# Patient Record
(demographics unavailable — no encounter records)

---

## 2025-02-26 NOTE — ASSESSMENT
[FreeTextEntry1] : 73 y o  M with prediabetes HLD  prostate ca treated with proton beam therapy hx of melanoma seen for elevated liver tests.  Elevated liver enzymes  ? JUSTINA Has elevated liver enzymes since Nov 2024 with elevation of ALT  165  Repeat lab testing in Feb 2025 show persisting elevation but improved Ashwagandha turmeric and use stopped in Jan 2025. Was on rosuvastatin Testosterone therapy several years ago Hx of melanoma and prostate ca HBsAg HCV ab negative СЕРГЕЙ AMA ASMA negative Hold supplements statin Will complete work up with HCV RNA HBV DNA Ig levels Will get MR abdomen with and without  Wt loss Bloating Check ESR CRP Will get MR abdomen and pelvis with hx of prostate ca and melanoma  Metabolic syndrome Hx of BMI 29 prediabetes and HLD Fibroscan -  CAP  [id number]  LS 4.9 F0 fibrosis  Health maintenance HBV HCV screening negative Does not have immunity for hepatitis A and B and advised  vaccination Colonoscopy done in 2023.

## 2025-02-26 NOTE — HISTORY OF PRESENT ILLNESS
[___] : Performed [unfilled] [de-identified] : 73 y o  M with HLD prediabetes OA prostate ca seen for elevated liver tests.   Patient was found to have elevated liver enzymes in Nov 2024  on routine blood work ordered by endocrinologist Dr Tim. AST 40   . Seen by PCP and had testing for autoimmune hepatitis and US abdomen done Nov 2024 showing normal liver. Patient has been on low dose rosuvastatin for years. Was taking Ashwagandha and curcumin as well as vitamin supplements for years. which he stopped in Jan 2025. Rarely drinks green tea. Denies herbal tea use.   Patient was seen by GI Dr Parish in Feb 2025 and had further blood work showing persisting elevation of liver tests. Has been having bloating and mid abdominal discomfort since June 2024. Reports acid reflux and that took Pepto-Bismol or Gaviscon which helped. Lost wt  with health eating and feels better. WTt was 190 lb and went down to 170 lb.  Admitted with COVID in 2020 and had elevation of liver enzymes then. No jaundice confusion hematemesis or melena.  No hx of liver biopsy or EGD. Has had multiple colonoscopies - last was in 2023 and no lesions reported.  Hx of prediabetes HLD. Prior BMI was 29. Hx of testosterone supplementation several years ago.  Hx of prostate ca and radiation   Retired   Lives alone. Has 2 sons. Alcohol few times a year. Drinks 2 glasses Ex smoker stopped in 2017. Smoked cigars occasionally. No drug use  No liver disease or liver cancer in family.     [de-identified] : Alb 3.9 TB 0.9  AST 79  WBC 8 Hb 15 Plt 180 MCV 90 N 76 % INR 0.8 СЕРГЕЙ negative ASMA AMA negative  [de-identified] : Unremarkable liver. Gallstones biliary sludge Normal spleen.  [FreeTextEntry1] : Nov 2024  Hepatitis  A total non-reactive Hepatitis A Ig M non-reactive HBcAb Ig M  HBcAb total HBsAg HBsAb non-reactive HCV ab non-reactive  HBa1c 5.9 Chol 241 HDL 51

## 2025-02-26 NOTE — CONSULT LETTER
[Dear  ___] : Dear  [unfilled], [Consult Letter:] : I had the pleasure of evaluating your patient, [unfilled]. [Please see my note below.] : Please see my note below. [Consult Closing:] : Thank you very much for allowing me to participate in the care of this patient.  If you have any questions, please do not hesitate to contact me. [Sincerely,] : Sincerely, [DrRamy  ___] : Dr. DUQUE [FreeTextEntry2] : Aaron Parish MD [FreeTextEntry3] : Gia Noriega

## 2025-02-26 NOTE — REVIEW OF SYSTEMS
[Recent Weight Loss (___ Lbs)] : recent [unfilled] ~Ulb weight loss [Heartburn] : heartburn [Easy Bruising] : a tendency for easy bruising [Fever] : no fever [Chills] : no chills [Recent Weight Gain (___ Lbs)] : no recent weight gain [Eye Pain] : no eye pain [Red Eyes] : eyes not red [Earache] : no earache [Loss Of Hearing] : no hearing loss [Chest Pain] : no chest pain [Palpitations] : no palpitations [Cough] : no cough [SOB on Exertion] : no shortness of breath during exertion [Vomiting] : no vomiting [Constipation] : no constipation [Diarrhea] : no diarrhea [Joint Swelling] : no joint swelling [Confused] : no confusion [Convulsions] : no convulsions [Easy Bleeding] : no tendency for easy bleeding [FreeTextEntry7] : bloating

## 2025-02-26 NOTE — PHYSICAL EXAM
[Liver Size (___ Cm)] : Liver size [unfilled] cm [General Appearance - Alert] : alert [General Appearance - Well Nourished] : well nourished [General Appearance - Well Developed] : well developed [Sclera] : the sclera and conjunctiva were normal [Outer Ear] : the ears and nose were normal in appearance [Hearing Threshold Finger Rub Not [place]] : hearing was normal [Neck Cervical Mass (___cm)] : no neck mass was observed [Jugular Venous Distention Increased] : there was no jugular-venous distention [Thyroid Diffuse Enlargement] : the thyroid was not enlarged [Respiration, Rhythm And Depth] : normal respiratory rhythm and effort [Auscultation Breath Sounds / Voice Sounds] : lungs were clear to auscultation bilaterally [Heart Sounds] : normal S1 and S2 [Murmurs] : no murmurs [Edema] : there was no peripheral edema [Abdomen Soft] : soft [Abdomen Tenderness] : non-tender [Cervical Lymph Nodes Enlarged Posterior Bilaterally] : posterior cervical [Cervical Lymph Nodes Enlarged Anterior Bilaterally] : anterior cervical [Supraclavicular Lymph Nodes Enlarged Bilaterally] : supraclavicular [Nail Clubbing] : no clubbing  or cyanosis of the fingernails [Involuntary Movements] : no involuntary movements were seen [Musculoskeletal - Swelling] : no joint swelling seen [No Focal Deficits] : no focal deficits [Oriented To Time, Place, And Person] : oriented to person, place, and time [Scleral Icterus] : No Scleral Icterus [Spider Angioma] : No spider angioma(s) were observed [Abdominal  Ascites] : no ascites [Splenomegaly] : no splenomegaly [Liver Palpable ___ Finger Breadths Below Costal Margin] : was not palpable below costal margin [Asterixis] : no asterixis observed [FreeTextEntry1] : ecchymosis+

## 2025-02-26 NOTE — PHYSICAL EXAM
[Liver Size (___ Cm)] : Liver size [unfilled] cm [General Appearance - Alert] : alert [General Appearance - Well Nourished] : well nourished [General Appearance - Well Developed] : well developed [Sclera] : the sclera and conjunctiva were normal [Outer Ear] : the ears and nose were normal in appearance [Hearing Threshold Finger Rub Not [place]] : hearing was normal [Neck Cervical Mass (___cm)] : no neck mass was observed [Jugular Venous Distention Increased] : there was no jugular-venous distention [Thyroid Diffuse Enlargement] : the thyroid was not enlarged [Respiration, Rhythm And Depth] : normal respiratory rhythm and effort [Auscultation Breath Sounds / Voice Sounds] : lungs were clear to auscultation bilaterally [Heart Sounds] : normal S1 and S2 [Murmurs] : no murmurs [Edema] : there was no peripheral edema [Abdomen Soft] : soft [Abdomen Tenderness] : non-tender [Cervical Lymph Nodes Enlarged Posterior Bilaterally] : posterior cervical [Cervical Lymph Nodes Enlarged Anterior Bilaterally] : anterior cervical [Supraclavicular Lymph Nodes Enlarged Bilaterally] : supraclavicular [Nail Clubbing] : no clubbing  or cyanosis of the fingernails [Involuntary Movements] : no involuntary movements were seen [Musculoskeletal - Swelling] : no joint swelling seen [No Focal Deficits] : no focal deficits [Oriented To Time, Place, And Person] : oriented to person, place, and time [Scleral Icterus] : No Scleral Icterus [Spider Angioma] : No spider angioma(s) were observed [Abdominal  Ascites] : no ascites [Splenomegaly] : no splenomegaly [Liver Palpable ___ Finger Breadths Below Costal Margin] : was not palpable below costal margin [Asterixis] : no asterixis observed [FreeTextEntry1] : ecchymosis+

## 2025-02-26 NOTE — HISTORY OF PRESENT ILLNESS
[___] : Performed [unfilled] [de-identified] : 73 y o  M with HLD prediabetes OA prostate ca seen for elevated liver tests.   Patient was found to have elevated liver enzymes in Nov 2024  on routine blood work ordered by endocrinologist Dr Tim. AST 40   . Seen by PCP and had testing for autoimmune hepatitis and US abdomen done Nov 2024 showing normal liver. Patient has been on low dose rosuvastatin for years. Was taking Ashwagandha and curcumin as well as vitamin supplements for years. which he stopped in Jan 2025. Rarely drinks green tea. Denies herbal tea use.   Patient was seen by GI Dr Parish in Feb 2025 and had further blood work showing persisting elevation of liver tests. Has been having bloating and mid abdominal discomfort since June 2024. Reports acid reflux and that took Pepto-Bismol or Gaviscon which helped. Lost wt  with health eating and feels better. WTt was 190 lb and went down to 170 lb.  Admitted with COVID in 2020 and had elevation of liver enzymes then. No jaundice confusion hematemesis or melena.  No hx of liver biopsy or EGD. Has had multiple colonoscopies - last was in 2023 and no lesions reported.  Hx of prediabetes HLD. Prior BMI was 29. Hx of testosterone supplementation several years ago.  Hx of prostate ca and radiation   Retired   Lives alone. Has 2 sons. Alcohol few times a year. Drinks 2 glasses Ex smoker stopped in 2017. Smoked cigars occasionally. No drug use  No liver disease or liver cancer in family.     [de-identified] : Alb 3.9 TB 0.9  AST 79  WBC 8 Hb 15 Plt 180 MCV 90 N 76 % INR 0.8 СЕРГЕЙ negative ASMA AMA negative  [de-identified] : Unremarkable liver. Gallstones biliary sludge Normal spleen.  [FreeTextEntry1] : Nov 2024  Hepatitis  A total non-reactive Hepatitis A Ig M non-reactive HBcAb Ig M  HBcAb total HBsAg HBsAb non-reactive HCV ab non-reactive  HBa1c 5.9 Chol 241 HDL 51

## 2025-03-04 NOTE — ASSESSMENT
[Carbohydrate Consistent Diet] : carbohydrate consistent diet [Importance of Diet and Exercise] : importance of diet and exercise to improve glycemic control, achieve weight loss and improve cardiovascular health [Exercise/Effect on Glucose] : exercise/effect on glucose [Weight Loss] : weight loss [FreeTextEntry1] : 73  year old male patient with history of prostate cancer diagnosed 3 years ago who present for follow up evaluation of hypogonadism /DL / prediabetes    # hypertriglyceridemia/ preDM  - TG > 500 and LDL high ,did not tolerate fenofibrate ,3/2022 started on  Crestor 5 mg daily ,and Vascepa (tolerating only 1 g BID )  - 2/2023: taking crestor every other day , LDL ok and Tg stable  - 2/2024:  and Tg 399 as he was off meds , will restart crestor 5 mg daily and vascepa 2 tablets daily  - monitor levels , A1c is 6% , continue with diet and exercise  - 5/2024:   - 2/2025  off statin because of elevated lfts followed by hepatology , stay off statin , advised  low fat diet monitor labs    #hypogonadism  - 5 years prior to his diagnosis of prostate cancer patient was taking testosterone injections ( no prior hypogonadism diagnosis ), states was only using it to feel stronger especially when exercising.  - Since diagnosis of prostate cancer he stopped all testosterone injections , he felt weak and tired but his symptoms starts to improve gradually. - gynecoid features  - repeat levels with low T and inadequate LH and FSH , likely secondary to prior testosterone use  now on Rapaflo ( alpha blocker 0 , use Viagra PRN) - discussed previously with urologist, given patient history of high grade prostate cancer  use of clomiphene and after taking about risks and benefits patient agreed to try it but he did not tolerate and stopped , he will follow up with urology  - DXA scan (5/2022 ) normal   - continue to monitor with urology

## 2025-03-04 NOTE — PHYSICAL EXAM
[Alert] : alert [Obese] : obese [No Acute Distress] : no acute distress [No Proptosis] : no proptosis [No Lid Lag] : no lid lag [Thyroid Not Enlarged] : the thyroid was not enlarged [No Thyroid Nodules] : no palpable thyroid nodules [No Respiratory Distress] : no respiratory distress [No Accessory Muscle Use] : no accessory muscle use [Clear to Auscultation] : lungs were clear to auscultation bilaterally [Regular Rhythm] : with a regular rhythm [No Edema] : no peripheral edema [Gynecomastia] : gynecomastia present [No CVA Tenderness] : no ~M costovertebral angle tenderness [No Stigmata of [place] Syndrome] : no stigmata of [place] Syndrome [No Tremors] : no tremors [Oriented x3] : oriented to person, place, and time [Abdominal Striae] : no abdominal striae [Acanthosis Nigricans] : no acanthosis nigricans

## 2025-03-04 NOTE — DATA REVIEWED
[FreeTextEntry1] : 11/2020 testosterone <150 ( in SCM )  12/7/21: hb 15.3  Na 141 K 4.2 glucose 100 crea 1  GFR 76 AST 20 ALT 20 ALK flavio 96    LH 5.3  FSH 7.6  TSH 1.61  Ft4 1.3  total test 51 Free T 2 SHBG 38.2 prolactin 8  3/10/22: Tg 543  TSH 1.89 LH 5.5 prolactin 7.1 FSH 6.8 Total testosterone 42 free T 1.1 SHBG 36.4  6/2/22: A1c 5.8% Tg 395  LDL 70 na 142 K 4.5  crea 1.1  GFR 72 glucose 114 TSH 1.39  25 vit D 43 2/2023: LDL  73    Total test 68.9  free testosterone 0.6  2/204: A1c 6%  tg  5/2024: glucose 102 crea 1  GFr 80  Tg 321  11/2024 : TSH 1.66 A1c 5.9%   ALT 165alk phso 226  GGFR 80 25 vit d 44   DXA scan (5/26/22) : normal

## 2025-03-04 NOTE — REVIEW OF SYSTEMS
[Recent Weight Loss (___ Lbs)] : recent weight loss: [unfilled] lbs [As Noted in HPI] : as noted in HPI [Fatigue] : no fatigue [Recent Weight Gain (___ Lbs)] : no recent weight gain [Blurred Vision] : no blurred vision [Dysphagia] : no dysphagia [Dysphonia] : no dysphonia [Chest Pain] : no chest pain [Palpitations] : no palpitations [Fast Heart Rate] : heart rate is not fast [Shortness Of Breath] : no shortness of breath [Orthopnea] : no orthopnea [SOB on Exertion] : no shortness of breath on exertion [Nausea] : no nausea [Constipation] : no constipation [Vomiting] : no vomiting [Diarrhea] : no diarrhea [Headaches] : no headaches [Cold Intolerance] : no cold intolerance [Heat Intolerance] : no heat intolerance [FreeTextEntry7] : lactose intolerant

## 2025-03-04 NOTE — HISTORY OF PRESENT ILLNESS
[FreeTextEntry1] : 73  year old male patient with history of high grade  prostate cancer s/p surgery and radiation , o who present for follow up  evaluation of hypogonadism , DL   #5 years prior to his diagnosis of prostate cancer patient was taking testosterone injections ( no prior hypogonadism diagnosis ), states was only using it to feel stronger especially when exercising.  Since diagnosis of prostate cancer he stopped all testosterone injections , he felt weak and tired but his symptoms starts to improve gradually.   3/2022: previous visit discussed with patient and urologist trial of clomiphene  and patient had agreed but was not able to tolerate it and stopped it . feeling ok but low energy. did not do DXA scan yet  Dl: was unable to tolerate fenofibrate, trying to watch diet and walking   6/2022: patient is here for follow up feels ok , taking crestor and vascepa 1 g BID tolerating it better no new complaints except for right knee pain   2/2023: patient present for follow up , feels ok , taking crestor 5 mg every other day , LDL ok , Tg remain higher then target but improving taking multiple supplements : vit D , Ashwagandha, tumeric, MVI  men vitamin and was advised to take some testosterone boosters   3/2024:  patient present for follow up , he is off crestor as he ran out and changed PCP , LDL high and Tg too. feels ok but had few illnesses in OCt and nov 2023 8/2024: patient present for folow up he is off crestor , 5/2024  off crestor on fish il   Tg remain high , feels ok   2/2025 remain off statin because of elevated liver enzymes , US was ok , planned for MRi and followed by hepatology

## 2025-03-31 NOTE — HISTORY OF PRESENT ILLNESS
[___ Month(s) Ago] : [unfilled] month(s) ago [___] : Performed [unfilled] [de-identified] : 2/26/25 [de-identified] :  73 y o  M with HLD prediabetes OA prostate ca seen for elevated liver tests.  Patient was found to have elevated liver enzymes in Nov 2024 on routine blood work ordered by endocrinologist Dr Tim. AST 40  . Seen by PCP and had testing for autoimmune hepatitis and US abdomen done Nov 2024 showing normal liver. Patient has been on low dose rosuvastatin for years. Was taking Ashwagandha and curcumin as well as vitamin supplements for years. which he stopped in Jan 2025. Rarely drinks green tea. Denies herbal tea use.  Patient was seen by GI Dr Parish in Feb 2025 and had further blood work showing persisting elevation of liver tests. Has been having bloating and mid abdominal discomfort since June 2024. Reports acid reflux and that took Pepto-Bismol or Gaviscon which helped. Lost wt with health eating and feels better. WTt was 190 lb and went down to 170 lb. Admitted with COVID in 2020 and had elevation of liver enzymes then. No hx of liver biopsy or EGD. Has had multiple colonoscopies - last was in 2023 and no lesions reported. Hx of prediabetes HLD. Prior BMI was 29. Hx of testosterone supplementation several years ago. Hx of prostate ca and radiation  Has upper abdominal pain. Pain occurred few weeks ago about 1.5 hours after eating pasta. Feels the sauce was spoilt. Lasts 1/2 hour.  Antacid helped.  No jaundice confusion hematemesis or melena. No more wt loss.  Reports having chills after the first dose of hepatitis B. Does not want to do the second dose.  Did not do hepatitis A vaccine.  Still takes turmeric. [de-identified] : 1.  Cholelithiasis and choledocholithiasis with CBD and minimal intrahepatic biliary ductal dilation. 2.  Infrarenal abdominal aortic aneurysm measuring up to 3.7 cm. 3.  Nonspecific signal abnormality in the visualized prostate gland. Correlate with PSA level. [FreeTextEntry1] : Retired   Lives alone. Has 2 sons. Alcohol few times a year. Drinks 2 glasses Ex smoker stopped in 2017. Smoked cigars occasionally. No drug use  No liver disease or liver cancer in family.   Pertinent Labs: Alb 3.9 TB 0.9  AST 79  WBC 8 Hb 15 Plt 180 MCV 90 N 76 % INR 0.8 СЕРГЕЙ negative ASMA AMA negative, Performed Feb 13 2025   Ultrasound: Unremarkable liver. Gallstones biliary sludge Normal spleen., Performed Nov 2024.    Nov 2024 Hepatitis A total non-reactive Hepatitis A Ig M non-reactive HBcAb Ig M HBcAb total HBsAg HBsAb non-reactive HCV ab non-reactive HBa1c 5.9 Chol 241 HDL 51

## 2025-03-31 NOTE — PHYSICAL EXAM
[Liver Size (___ Cm)] : Liver size [unfilled] cm [General Appearance - Alert] : alert [General Appearance - Well Nourished] : well nourished [General Appearance - Well Developed] : well developed [Sclera] : the sclera and conjunctiva were normal [Outer Ear] : the ears and nose were normal in appearance [Hearing Threshold Finger Rub Not [place]] : hearing was normal [Neck Cervical Mass (___cm)] : no neck mass was observed [Jugular Venous Distention Increased] : there was no jugular-venous distention [Thyroid Diffuse Enlargement] : the thyroid was not enlarged [Respiration, Rhythm And Depth] : normal respiratory rhythm and effort [Auscultation Breath Sounds / Voice Sounds] : lungs were clear to auscultation bilaterally [Heart Sounds] : normal S1 and S2 [Murmurs] : no murmurs [Edema] : there was no peripheral edema [Abdomen Soft] : soft [Abdomen Tenderness] : non-tender [Cervical Lymph Nodes Enlarged Posterior Bilaterally] : posterior cervical [Cervical Lymph Nodes Enlarged Anterior Bilaterally] : anterior cervical [Supraclavicular Lymph Nodes Enlarged Bilaterally] : supraclavicular [Nail Clubbing] : no clubbing  or cyanosis of the fingernails [Involuntary Movements] : no involuntary movements were seen [Musculoskeletal - Swelling] : no joint swelling seen [No Focal Deficits] : no focal deficits [Oriented To Time, Place, And Person] : oriented to person, place, and time [Scleral Icterus] : No Scleral Icterus [Spider Angioma] : No spider angioma(s) were observed [Abdominal  Ascites] : no ascites [Splenomegaly] : no splenomegaly [Liver Palpable ___ Finger Breadths Below Costal Margin] : was not palpable below costal margin [Asterixis] : no asterixis observed [FreeTextEntry1] : ecchymosis+

## 2025-03-31 NOTE — ASSESSMENT
[FreeTextEntry1] : 73 y o  M with prediabetes HLD prostate ca treated with proton beam therapy hx of melanoma seen for elevated liver tests.  Elevated liver enzymes ? JUSTINA Has elevated liver enzymes since Nov 2024 with elevation of   Repeat lab testing in Feb 2025 show persisting elevation but improved Ashwagandha turmeric and use stopped in Jan 2025. Reports taking turmeric still. Was on rosuvastatin Testosterone therapy several years ago but not on it anymore.  Hx of melanoma and prostate ca HBsAg HCV ab negative СЕРГЕЙ AMA ASMA negative Hold supplements statin. Stop turmeric.  Liver tests improving  HCV RNA HBV DNA negative Ig levels normal except for mild kappa elevation with normal ratio MR abdomen with and without showed normal liver but has dilated CBD and AAA Repeat liver tests in 3 months  Metabolic syndrome Hx of BMI 29 prediabetes and HLD Fibroscan -  S0 LS 4.9 F0 fibrosis  Wt loss Bloating Normal ESR CRP No liver or abdominal mass seen on MR abdomen and pelvis with hx of prostate ca and melanoma  Choledocholithiasis No abdominal pain. MR shows CBD 1 cm.  Liver tests elevation improved but reports pain few weeks ago after eating pasta. [facility] referral  [facility] referral   Health maintenance HBV HCV screening negative Does not have immunity for hepatitis A and advised vaccination Does not have immunity to hepatitis B and took the first dose and had chills.  Colonoscopy done in 2023.

## 2025-03-31 NOTE — REVIEW OF SYSTEMS
[Fever] : no fever [Chills] : no chills [Recent Weight Gain (___ Lbs)] : no recent weight gain [Recent Weight Loss (___ Lbs)] : no recent weight loss [Eye Pain] : no eye pain [Red Eyes] : eyes not red [Earache] : no earache [Loss Of Hearing] : no hearing loss [Chest Pain] : no chest pain [Palpitations] : no palpitations [Cough] : no cough [SOB on Exertion] : no shortness of breath during exertion [Diarrhea] : no diarrhea [Joint Swelling] : no joint swelling [Joint Stiffness] : no joint stiffness [Confused] : no confusion [Convulsions] : no convulsions [Easy Bleeding] : no tendency for easy bleeding [Easy Bruising] : no tendency for easy bruising

## 2025-03-31 NOTE — HISTORY OF PRESENT ILLNESS
[___ Month(s) Ago] : [unfilled] month(s) ago [___] : Performed [unfilled] [de-identified] : 2/26/25 [de-identified] :  73 y o  M with HLD prediabetes OA prostate ca seen for elevated liver tests.  Patient was found to have elevated liver enzymes in Nov 2024 on routine blood work ordered by endocrinologist Dr Tim. AST 40  . Seen by PCP and had testing for autoimmune hepatitis and US abdomen done Nov 2024 showing normal liver. Patient has been on low dose rosuvastatin for years. Was taking Ashwagandha and curcumin as well as vitamin supplements for years. which he stopped in Jan 2025. Rarely drinks green tea. Denies herbal tea use.  Patient was seen by GI Dr Parish in Feb 2025 and had further blood work showing persisting elevation of liver tests. Has been having bloating and mid abdominal discomfort since June 2024. Reports acid reflux and that took Pepto-Bismol or Gaviscon which helped. Lost wt with health eating and feels better. WTt was 190 lb and went down to 170 lb. Admitted with COVID in 2020 and had elevation of liver enzymes then. No hx of liver biopsy or EGD. Has had multiple colonoscopies - last was in 2023 and no lesions reported. Hx of prediabetes HLD. Prior BMI was 29. Hx of testosterone supplementation several years ago. Hx of prostate ca and radiation  Has upper abdominal pain. Pain occurred few weeks ago about 1.5 hours after eating pasta. Feels the sauce was spoilt. Lasts 1/2 hour.  Antacid helped.  No jaundice confusion hematemesis or melena. No more wt loss.  Reports having chills after the first dose of hepatitis B. Does not want to do the second dose.  Did not do hepatitis A vaccine.  Still takes turmeric. [de-identified] : 1.  Cholelithiasis and choledocholithiasis with CBD and minimal intrahepatic biliary ductal dilation. 2.  Infrarenal abdominal aortic aneurysm measuring up to 3.7 cm. 3.  Nonspecific signal abnormality in the visualized prostate gland. Correlate with PSA level. [FreeTextEntry1] : Retired   Lives alone. Has 2 sons. Alcohol few times a year. Drinks 2 glasses Ex smoker stopped in 2017. Smoked cigars occasionally. No drug use  No liver disease or liver cancer in family.   Pertinent Labs: Alb 3.9 TB 0.9  AST 79  WBC 8 Hb 15 Plt 180 MCV 90 N 76 % INR 0.8 СЕРГЕЙ negative ASMA AMA negative, Performed Feb 13 2025   Ultrasound: Unremarkable liver. Gallstones biliary sludge Normal spleen., Performed Nov 2024.    Nov 2024 Hepatitis A total non-reactive Hepatitis A Ig M non-reactive HBcAb Ig M HBcAb total HBsAg HBsAb non-reactive HCV ab non-reactive HBa1c 5.9 Chol 241 HDL 51

## 2025-03-31 NOTE — PHYSICAL EXAM
[Liver Size (___ Cm)] : Liver size [unfilled] cm [General Appearance - Alert] : alert [General Appearance - Well Nourished] : well nourished [General Appearance - Well Developed] : well developed [Sclera] : the sclera and conjunctiva were normal [Outer Ear] : the ears and nose were normal in appearance [Hearing Threshold Finger Rub Not [place]] : hearing was normal [Neck Cervical Mass (___cm)] : no neck mass was observed [Jugular Venous Distention Increased] : there was no jugular-venous distention [Thyroid Diffuse Enlargement] : the thyroid was not enlarged [Respiration, Rhythm And Depth] : normal respiratory rhythm and effort [Auscultation Breath Sounds / Voice Sounds] : lungs were clear to auscultation bilaterally [Heart Sounds] : normal S1 and S2 [Murmurs] : no murmurs [Edema] : there was no peripheral edema [Abdomen Soft] : soft [Abdomen Tenderness] : non-tender [Cervical Lymph Nodes Enlarged Posterior Bilaterally] : posterior cervical [Cervical Lymph Nodes Enlarged Anterior Bilaterally] : anterior cervical [Supraclavicular Lymph Nodes Enlarged Bilaterally] : supraclavicular [Nail Clubbing] : no clubbing  or cyanosis of the fingernails [Involuntary Movements] : no involuntary movements were seen [Musculoskeletal - Swelling] : no joint swelling seen [No Focal Deficits] : no focal deficits [Oriented To Time, Place, And Person] : oriented to person, place, and time [Scleral Icterus] : No Scleral Icterus [Spider Angioma] : No spider angioma(s) were observed [Abdominal  Ascites] : no ascites [Splenomegaly] : no splenomegaly [Liver Palpable ___ Finger Breadths Below Costal Margin] : was not palpable below costal margin [Asterixis] : no asterixis observed [FreeTextEntry1] : ecchymosis+

## 2025-03-31 NOTE — PHYSICAL EXAM
[Liver Size (___ Cm)] : Liver size [unfilled] cm [General Appearance - Alert] : alert [General Appearance - Well Nourished] : well nourished [General Appearance - Well Developed] : well developed [Sclera] : the sclera and conjunctiva were normal [Outer Ear] : the ears and nose were normal in appearance [Hearing Threshold Finger Rub Not [place]] : hearing was normal [Neck Cervical Mass (___cm)] : no neck mass was observed [Jugular Venous Distention Increased] : there was no jugular-venous distention [Thyroid Diffuse Enlargement] : the thyroid was not enlarged [Respiration, Rhythm And Depth] : normal respiratory rhythm and effort [Auscultation Breath Sounds / Voice Sounds] : lungs were clear to auscultation bilaterally [Heart Sounds] : normal S1 and S2 [Murmurs] : no murmurs [Edema] : there was no peripheral edema [Abdomen Soft] : soft [Abdomen Tenderness] : non-tender [Cervical Lymph Nodes Enlarged Posterior Bilaterally] : posterior cervical [Cervical Lymph Nodes Enlarged Anterior Bilaterally] : anterior cervical [Supraclavicular Lymph Nodes Enlarged Bilaterally] : supraclavicular [Nail Clubbing] : no clubbing  or cyanosis of the fingernails [Involuntary Movements] : no involuntary movements were seen [Musculoskeletal - Swelling] : no joint swelling seen [No Focal Deficits] : no focal deficits [Oriented To Time, Place, And Person] : oriented to person, place, and time [Scleral Icterus] : No Scleral Icterus [Spider Angioma] : No spider angioma(s) were observed [Abdominal  Ascites] : no ascites [Splenomegaly] : no splenomegaly [Liver Palpable ___ Finger Breadths Below Costal Margin] : was not palpable below costal margin [Asterixis] : no asterixis observed [FreeTextEntry1] : ecchymosis+

## 2025-03-31 NOTE — HISTORY OF PRESENT ILLNESS
[___ Month(s) Ago] : [unfilled] month(s) ago [___] : Performed [unfilled] [de-identified] : 2/26/25 [de-identified] :  73 y o  M with HLD prediabetes OA prostate ca seen for elevated liver tests.  Patient was found to have elevated liver enzymes in Nov 2024 on routine blood work ordered by endocrinologist Dr Tim. AST 40  . Seen by PCP and had testing for autoimmune hepatitis and US abdomen done Nov 2024 showing normal liver. Patient has been on low dose rosuvastatin for years. Was taking Ashwagandha and curcumin as well as vitamin supplements for years. which he stopped in Jan 2025. Rarely drinks green tea. Denies herbal tea use.  Patient was seen by GI Dr Parish in Feb 2025 and had further blood work showing persisting elevation of liver tests. Has been having bloating and mid abdominal discomfort since June 2024. Reports acid reflux and that took Pepto-Bismol or Gaviscon which helped. Lost wt with health eating and feels better. WTt was 190 lb and went down to 170 lb. Admitted with COVID in 2020 and had elevation of liver enzymes then. No hx of liver biopsy or EGD. Has had multiple colonoscopies - last was in 2023 and no lesions reported. Hx of prediabetes HLD. Prior BMI was 29. Hx of testosterone supplementation several years ago. Hx of prostate ca and radiation  Has upper abdominal pain. Pain occurred few weeks ago about 1.5 hours after eating pasta. Feels the sauce was spoilt. Lasts 1/2 hour.  Antacid helped.  No jaundice confusion hematemesis or melena. No more wt loss.  Reports having chills after the first dose of hepatitis B. Does not want to do the second dose.  Did not do hepatitis A vaccine.  Still takes turmeric. [de-identified] : 1.  Cholelithiasis and choledocholithiasis with CBD and minimal intrahepatic biliary ductal dilation. 2.  Infrarenal abdominal aortic aneurysm measuring up to 3.7 cm. 3.  Nonspecific signal abnormality in the visualized prostate gland. Correlate with PSA level. [FreeTextEntry1] : Retired   Lives alone. Has 2 sons. Alcohol few times a year. Drinks 2 glasses Ex smoker stopped in 2017. Smoked cigars occasionally. No drug use  No liver disease or liver cancer in family.   Pertinent Labs: Alb 3.9 TB 0.9  AST 79  WBC 8 Hb 15 Plt 180 MCV 90 N 76 % INR 0.8 СЕРГЕЙ negative ASMA AMA negative, Performed Feb 13 2025   Ultrasound: Unremarkable liver. Gallstones biliary sludge Normal spleen., Performed Nov 2024.    Nov 2024 Hepatitis A total non-reactive Hepatitis A Ig M non-reactive HBcAb Ig M HBcAb total HBsAg HBsAb non-reactive HCV ab non-reactive HBa1c 5.9 Chol 241 HDL 51

## 2025-05-19 NOTE — ASSESSMENT
[FreeTextEntry1] : 73yoM elevated LFTs. Presents today as a referral by Dr. Noriega for choledocholithiasis.    #Choledocholithiasis -Will schedule ERCP -Risks vs. benefits discussed -Pt seeing Dr. Lee next week for infrarenal AAA 3.7cm  on Tuesday  -Discussed s/s of pancreatitis and cholangitis and the need to go to the ED -Can take Motrin 600mg BID and Tylenol for the pain  #Weight Loss -Could be secondary to decreased appetite due to pain

## 2025-05-19 NOTE — PHYSICAL EXAM
[Alert] : alert [Normal Voice/Communication] : normal voice/communication [Healthy Appearing] : healthy appearing [No Acute Distress] : no acute distress [Sclera] : the sclera and conjunctiva were normal [Hearing Threshold Finger Rub Not Sunflower] : hearing was normal [Normal Lips/Gums] : the lips and gums were normal [Oropharynx] : the oropharynx was normal [Normal Appearance] : the appearance of the neck was normal [No Neck Mass] : no neck mass was observed [No Respiratory Distress] : no respiratory distress [No Acc Muscle Use] : no accessory muscle use [Respiration, Rhythm And Depth] : normal respiratory rhythm and effort [Auscultation Breath Sounds / Voice Sounds] : lungs were clear to auscultation bilaterally [Heart Rate And Rhythm] : heart rate was normal and rhythm regular [Normal S1, S2] : normal S1 and S2 [Murmurs] : no murmurs [Bowel Sounds] : normal bowel sounds [Abdomen Tenderness] : non-tender [No Masses] : no abdominal mass palpated [Abdomen Soft] : soft [] : no hepatosplenomegaly [Oriented To Time, Place, And Person] : oriented to person, place, and time

## 2025-05-19 NOTE — HISTORY OF PRESENT ILLNESS
[FreeTextEntry1] : 73yoM elevated LFTs. Presents today as a referral by Dr. Noriega for choledocholithiasis.   Pt complaining of intermittent RUQ pain depending on diet. Patient had an MRI due to elevated LFTs and weight loss. Found to have a 0.8cm gallstone within the distal CBD.  States he had nausea and vomiting and diarrhea associated with the RUQ pain.   States he has heart burn every day since having the RUQ Pain. Lost   Denies dysphagia, unintentional weight loss, diarrhea, constipation, melena, hematochezia.   Last colonoscopy was in 2023.   Labs Reviewed 3/19/25 Hgb: 14.8 Hct: 46.8 ESR: 8 AST: 42 ALT: 72 Alk Phos: 173 eGFR: 79 Iron: 157 TIBC: 305 % Sat: 51   MR Abdomen w/wo IV Contrast 3/14/25 IMPRESSION: 1.  Cholelithiasis and choledocholithiasis with CBD and minimal intrahepatic biliary ductal dilation. 2.  Infrarenal abdominal aortic aneurysm measuring up to 3.7 cm. 3.  Nonspecific signal abnormality in the visualized prostate gland. Correlate with PSA level

## 2025-05-27 NOTE — HISTORY OF PRESENT ILLNESS
[FreeTextEntry1] : 74 y/o M with h/o gallstones, presents for incidental finding of 3.7 cm AAA on MRI of abdomen. He is scheduled for ERCP next week.

## 2025-05-27 NOTE — ASSESSMENT
[FreeTextEntry1] : 74 y/o M with h/o gallstones, presents for incidental finding of 3.7 cm AAA on MRI of abdomen. He is scheduled for ERCP next week.  MRI reviewed.  No surgical intervention needed. No contraindication to undergo GI procedure.  F/u in one year. Will obtain an aortic duplex at the time.  I, Dr. Gatito Lee, personally performed the evaluation and management (E/M) services for this new patient. That E/M includes conducting the clinically appropriate initial history &/or exam, assessing all conditions, and establishing the plan of care. Today, my KELSI, Prachi Blanco PA-C, was here to observe my evaluation and management service for this patient & follow plan of care established by me going forward.

## 2025-06-25 NOTE — REVIEW OF SYSTEMS
[Heartburn] : heartburn [Fever] : no fever [Chills] : no chills [Recent Weight Loss (___ Lbs)] : no recent weight loss [Eye Pain] : no eye pain [Red Eyes] : eyes not red [Earache] : no earache [Loss Of Hearing] : no hearing loss [Chest Pain] : no chest pain [Palpitations] : no palpitations [Lower Ext Edema] : no extremity edema [Shortness Of Breath] : no shortness of breath [Cough] : no cough [Abdominal Pain] : no abdominal pain [Vomiting] : no vomiting [Constipation] : no constipation [Diarrhea] : no diarrhea [Melena] : no melena [Joint Swelling] : no joint swelling [Skin Lesions] : no skin lesions [Confused] : no confusion [Convulsions] : no convulsions [Easy Bleeding] : no tendency for easy bleeding

## 2025-06-25 NOTE — ASSESSMENT
[FreeTextEntry1] : 73 y o  M with prediabetes HLD prostate ca treated with proton beam therapy hx of melanoma seen for elevated liver tests.  Elevated liver enzymes ? JUSTINA Has elevated liver enzymes since Nov 2024 with elevation of   Repeat lab testing in Feb 2025 show persisting elevation but improved Ashwagandha turmeric and use stopped in Jan 2025. Reports taking turmeric still. Was on rosuvastatin Testosterone therapy several years ago but not on it anymore. Hx of melanoma and prostate ca HBsAg HCV ab negative СЕРГЕЙ AMA ASMA negative Hold supplements statin. Stop turmeric.  HCV RNA HBV DNA negative Ig levels normal except for mild kappa elevation with normal ratio MR abdomen with and without showed normal liver but has dilated CBD and AAA Liver tests normal.  Repeat liver tests in 7 months  Metabolic syndrome Hx of BMI 29 prediabetes and HLD Fibroscan -  S0 LS 4.9 F0 fibrosis  Wt loss Bloating Normal ESR CRP No liver or abdominal mass seen on MR abdomen and pelvis with hx of prostate ca and melanoma  Choledocholithiasis s/p ERCP No abdominal pain. MR shows CBD 1 cm. Liver tests elevation improved but reports pain few weeks ago after eating pasta. s/p ERCP sphincterotomy and clearance of sludge  AAA 3.7 cm infrarenal AAA Seen Vascular surgery  and has follow up   Health maintenance HBV HCV screening negative Does not have immunity for hepatitis A and advised vaccination Does not have immunity to hepatitis B and took the first dose and had chills. Colonoscopy done in 2023.   Follow up in 6 months

## 2025-06-25 NOTE — PHYSICAL EXAM
[Liver Size (___ Cm)] : Liver size [unfilled] cm [General Appearance - Alert] : alert [General Appearance - Well Nourished] : well nourished [General Appearance - Well Developed] : well developed [Outer Ear] : the ears and nose were normal in appearance [Hearing Threshold Finger Rub Not [place]] : hearing was normal [Neck Cervical Mass (___cm)] : no neck mass was observed [Jugular Venous Distention Increased] : there was no jugular-venous distention [Thyroid Diffuse Enlargement] : the thyroid was not enlarged [Respiration, Rhythm And Depth] : normal respiratory rhythm and effort [Auscultation Breath Sounds / Voice Sounds] : lungs were clear to auscultation bilaterally [Heart Sounds] : normal S1 and S2 [Murmurs] : no murmurs [Abdomen Soft] : soft [Abdomen Tenderness] : non-tender [Cervical Lymph Nodes Enlarged Posterior Bilaterally] : posterior cervical [Cervical Lymph Nodes Enlarged Anterior Bilaterally] : anterior cervical [Supraclavicular Lymph Nodes Enlarged Bilaterally] : supraclavicular [Nail Clubbing] : no clubbing  or cyanosis of the fingernails [Involuntary Movements] : no involuntary movements were seen [Musculoskeletal - Swelling] : no joint swelling seen [No Focal Deficits] : no focal deficits [Oriented To Time, Place, And Person] : oriented to person, place, and time [Scleral Icterus] : No Scleral Icterus [Spider Angioma] : No spider angioma(s) were observed [Abdominal  Ascites] : no ascites [Splenomegaly] : no splenomegaly [Liver Palpable ___ Finger Breadths Below Costal Margin] : was not palpable below costal margin [Asterixis] : no asterixis observed [FreeTextEntry1] : ecchymosis+

## 2025-06-25 NOTE — HISTORY OF PRESENT ILLNESS
[___ Month(s) Ago] : [unfilled] month(s) ago [___] : Performed [unfilled] [de-identified] : 3/26/25 [de-identified] : 73 y o  M with HLD prediabetes OA prostate ca seen for elevated liver tests.  Patient was found to have elevated liver enzymes in Nov 2024 on routine blood work ordered by endocrinologist Dr Tim. AST 40  . Seen by PCP and had testing for autoimmune hepatitis and US abdomen done Nov 2024 showing normal liver. Patient has been on low dose rosuvastatin for years. Was taking Ashwagandha and curcumin as well as vitamin supplements for years. which he stopped in Jan 2025. Rarely drinks green tea. Denies herbal tea use.  Patient was seen by GI Dr Parish in Feb 2025 and had further blood work showing persisting elevation of liver tests. Has been having bloating and mid abdominal discomfort since June 2024. Reports acid reflux and that took Pepto-Bismol or Gaviscon which helped. Lost wt with health eating and feels better. WTt was 190 lb and went down to 170 lb. Admitted with COVID in 2020 and had elevation of liver enzymes then. No hx of liver biopsy or EGD. Has had multiple colonoscopies - last was in 2023 and no lesions reported. Hx of prediabetes HLD. Prior BMI was 29. Hx of testosterone supplementation several years ago. Hx of prostate ca and radiation Reports having chills after the first dose of hepatitis B. Does not want to do the second dose. Did not do hepatitis A vaccine.  Doing much better. Feels well No further abdominal pain. Reports had ERCP done with sphincterotomy and clearance of sludge. Started PPI. Rarely gets reflux No jaundice confusion hematemesis or melena. No more wt loss. Stopped all supplements [FreeTextEntry1] : MRI: 1. Cholelithiasis and choledocholithiasis with CBD and minimal intrahepatic biliary ductal dilation. 2. Infrarenal abdominal aortic aneurysm measuring up to 3.7 cm. 3. Nonspecific signal abnormality in the visualized prostate gland. Correlate with PSA level., Performed 3/11/25.    Retired   Lives alone. Has 2 sons. Alcohol few times a year. Drinks 2 glasses Ex smoker stopped in 2017. Smoked cigars occasionally. No drug use  No liver disease or liver cancer in family. Pertinent Labs: Alb 3.9 TB 0.9  AST 79  WBC 8 Hb 15 Plt 180 MCV 90 N 76 % INR 0.8 СЕРГЕЙ negative ASMA AMA negative, Performed Feb 13 2025 Ultrasound: Unremarkable liver. Gallstones biliary sludge Normal spleen., Performed Nov 2024.   Nov 2024 Hepatitis A total non-reactive Hepatitis A Ig M non-reactive HBcAb Ig M HBcAb total HBsAg HBsAb non-reactive HCV ab non-reactive HBa1c 5.9 Chol 241 HDL 51